# Patient Record
Sex: MALE | Race: WHITE | NOT HISPANIC OR LATINO | Employment: FULL TIME | URBAN - METROPOLITAN AREA
[De-identification: names, ages, dates, MRNs, and addresses within clinical notes are randomized per-mention and may not be internally consistent; named-entity substitution may affect disease eponyms.]

---

## 2022-11-16 ENCOUNTER — HOSPITAL ENCOUNTER (EMERGENCY)
Facility: HOSPITAL | Age: 47
Discharge: HOME | End: 2022-11-16
Attending: EMERGENCY MEDICINE

## 2022-11-16 VITALS
HEIGHT: 70 IN | RESPIRATION RATE: 18 BRPM | BODY MASS INDEX: 30.78 KG/M2 | SYSTOLIC BLOOD PRESSURE: 121 MMHG | HEART RATE: 76 BPM | WEIGHT: 215 LBS | DIASTOLIC BLOOD PRESSURE: 68 MMHG | OXYGEN SATURATION: 96 % | TEMPERATURE: 97.6 F

## 2022-11-16 DIAGNOSIS — M79.89 LEG SWELLING: Primary | ICD-10-CM

## 2022-11-16 RX ORDER — ENOXAPARIN SODIUM 100 MG/ML
30 INJECTION SUBCUTANEOUS ONCE
Status: COMPLETED | OUTPATIENT
Start: 2022-11-16 | End: 2022-11-16

## 2022-11-16 RX ADMIN — ENOXAPARIN SODIUM 30 MG: 30 INJECTION SUBCUTANEOUS at 22:42

## 2022-11-17 ENCOUNTER — HOSPITAL ENCOUNTER (OUTPATIENT)
Dept: NON INVASIVE DIAGNOSTICS | Facility: CLINIC | Age: 47
Discharge: HOME | End: 2022-11-17

## 2022-11-17 DIAGNOSIS — M79.89 LEG SWELLING: ICD-10-CM

## 2022-11-17 NOTE — ED ATTENDING ATTESTATION
11/16/2022  I, Caterina Inman MD, saw and evaluated the patient  I have discussed the patient with the resident/non-physician practitioner and agree with the resident's/non-physician practitioner's findings, Plan of Care, and MDM as documented in the resident's/non-physician practitioner's note, except where noted  All available labs and Radiology studies were reviewed  I was present for key portions of any procedure(s) performed by the resident/non-physician practitioner and I was immediately available to provide assistance  At this point I agree with the current assessment done in the Emergency Department  I have conducted an independent evaluation of this patient a history and physical is as follows:    S:  Chief Complaint   Patient presents with   • Leg Swelling     Pt c/o of right calf swelling and tightness that started this morning  Denies pain  Denies CP, SOB     Laya Lima is a 49 y o  male who presents with the chief complaint of right calf pain and tenderness  He reports he woke up this morning with this discomfort  His wife who is a nurse told him that it could be a blood clot but he brushed it off  He reports however that when a riley at the [employer] he frequents told him it could be a blood clot that he decided to come get it checked out  He denies fevers, redness in the leg, chest pain or shortness of breath  No history of trauma to the leg  No recent surgery, immobilization  No known cancers  O:  ED Triage Vitals [11/16/22 2122]   Temperature Pulse Respirations Blood Pressure SpO2   97 6 °F (36 4 °C) 72 16 160/72 96 %      Temp Source Heart Rate Source Patient Position - Orthostatic VS BP Location FiO2 (%)   Oral Monitor Sitting Right arm --      Pain Score       No Pain         Physical Exam  Vitals and nursing note reviewed  Constitutional:       General: He is in acute distress (mild)  Appearance: He is well-developed     HENT:      Head: Normocephalic and atraumatic  Eyes:      Extraocular Movements: Extraocular movements intact  Pupils: Pupils are equal, round, and reactive to light  Neck:      Vascular: No JVD  Cardiovascular:      Rate and Rhythm: Normal rate and regular rhythm  Heart sounds: Normal heart sounds  No murmur heard  No friction rub  No gallop  Pulmonary:      Effort: Pulmonary effort is normal  No respiratory distress  Breath sounds: Normal breath sounds  No wheezing or rales  Chest:      Chest wall: No tenderness  Musculoskeletal:         General: Tenderness (right calf) present  No signs of injury  Normal range of motion  Cervical back: Normal range of motion  Right lower leg: Edema (mild) present  Skin:     General: Skin is warm and dry  Neurological:      General: No focal deficit present  Mental Status: He is alert and oriented to person, place, and time  Psychiatric:         Behavior: Behavior normal          Thought Content: Thought content normal          Judgment: Judgment normal        A/P:  Patient presents with swelling and pain in his right calf  It is possible that this is a DVT however the patient presented after our vascular ultrasound hours  Patient will be given a dose of enoxaparin and will have an outpatient ultrasound ordered for him to be scheduled in the morning  Per our protocol  This is explained to the patient and he is comfortable with this plan  Other differential considerations include muscular strain, contusion, Baker cyst   No signs or symptoms consistent with infection  Patient has normal pulses so doubt arterial component  Patient has normal sensation  Patient has normal strength as well      ED Course     Medications   enoxaparin (LOVENOX) subcutaneous injection 30 mg (30 mg Subcutaneous Given 11/16/22 2249)         Critical Care Time  Procedures    Time reflects when diagnosis was documented in both MDM as applicable and the Disposition within this note Time User Action Codes Description Comment    11/16/2022 10:19 PM Katelin Plummer Add [A97 03] Leg swelling       ED Disposition     ED Disposition   Discharge    Condition   Stable    Date/Time   Wed Nov 16, 2022 10:19 PM    Comment   Cr Mayer discharge to home/self care                 Follow-up Information     Follow up With Specialties Details Why Contact Info Additional Information    [facility] Emergency Medicine  As needed, If symptoms worsen [address] Emergency Department, [address], [place], [place], [address]

## 2022-11-19 NOTE — ED PROVIDER NOTES
History  Chief Complaint   Patient presents with   • Leg Swelling     Pt c/o of right calf swelling and tightness that started this morning  Denies pain  Denies CP, SOB     54-year-old male with no significant past medical history presents for evaluation of right calf pain and swelling that started this morning when he woke up  Patient states he has a history of cramping that occurs in his calf muscles  Patient states this morning the pain felt similar however later in the evening he noticed that his right calf is more swollen compared to his left  Patient denies history of DVT  Patient is not on any anticoagulation  Patient denies chest pain, shortness of breath or any other concerns at this time  None       History reviewed  No pertinent past medical history  Past Surgical History:   Procedure Laterality Date   • GALLBLADDER SURGERY     • HERNIA REPAIR     • STOMACH SURGERY      gastric sleeve       History reviewed  No pertinent family history  I have reviewed and agree with the history as documented  E-Cigarette/Vaping     E-Cigarette/Vaping Substances     Social History     Tobacco Use   • Smoking status: Never   • Smokeless tobacco: Never   Substance Use Topics   • Drug use: Never        Review of Systems   Constitutional: Negative for chills and fever  HENT: Negative for ear pain and sore throat  Eyes: Negative for pain and visual disturbance  Respiratory: Negative for cough and shortness of breath  Cardiovascular: Negative for chest pain and palpitations  Gastrointestinal: Negative for abdominal pain and vomiting  Genitourinary: Negative for dysuria and hematuria  Musculoskeletal: Negative for arthralgias and back pain  Right calf pain and swelling    Skin: Negative for color change and rash  Neurological: Negative for seizures and syncope  All other systems reviewed and are negative        Physical Exam  ED Triage Vitals [11/16/22 2122]   Temperature Pulse Respirations Blood Pressure SpO2   97 6 °F (36 4 °C) 72 16 160/72 96 %      Temp Source Heart Rate Source Patient Position - Orthostatic VS BP Location FiO2 (%)   Oral Monitor Sitting Right arm --      Pain Score       No Pain             Orthostatic Vital Signs  Vitals:    11/16/22 2122 11/16/22 2153 11/16/22 2200   BP: 160/72 121/68 121/68   Pulse: 72 76    Patient Position - Orthostatic VS: Sitting  Sitting       Physical Exam  Vitals and nursing note reviewed  Constitutional:       General: He is not in acute distress  Appearance: Normal appearance  He is well-developed  He is not ill-appearing, toxic-appearing or diaphoretic  HENT:      Head: Normocephalic and atraumatic  Mouth/Throat:      Mouth: Mucous membranes are moist    Eyes:      Conjunctiva/sclera: Conjunctivae normal    Cardiovascular:      Rate and Rhythm: Normal rate and regular rhythm  Heart sounds: No murmur heard  Pulmonary:      Effort: Pulmonary effort is normal  No respiratory distress  Breath sounds: Normal breath sounds  Abdominal:      General: Abdomen is flat  Palpations: Abdomen is soft  Tenderness: There is no abdominal tenderness  Musculoskeletal:         General: No swelling  Cervical back: Neck supple  Comments: Mild tenderness overlying right calf with mild swelling compared to the left calf; no overlying skin changes; neurovascularly intact    Skin:     General: Skin is warm and dry  Capillary Refill: Capillary refill takes less than 2 seconds  Neurological:      Mental Status: He is alert     Psychiatric:         Mood and Affect: Mood normal          ED Medications  Medications   enoxaparin (LOVENOX) subcutaneous injection 30 mg (30 mg Subcutaneous Given 11/16/22 2242)       Diagnostic Studies  Results Reviewed     None                 No orders to display         Procedures  Procedures      ED Course                             SBIRT 20yo+    Flowsheet Row Most Recent Value SBIRT (23 yo +)    In order to provide better care to our patients, we are screening all of our patients for alcohol and drug use  Would it be okay to ask you these screening questions? Yes Filed at: 11/16/2022 2150   Initial Alcohol Screen: US AUDIT-C     1  How often do you have a drink containing alcohol? 0 Filed at: 11/16/2022 2150   2  How many drinks containing alcohol do you have on a typical day you are drinking? 0 Filed at: 11/16/2022 2150   3a  Male UNDER 65: How often do you have five or more drinks on one occasion? 0 Filed at: 11/16/2022 2150   3b  FEMALE Any Age, or MALE 65+: How often do you have 4 or more drinks on one occassion? 0 Filed at: 11/16/2022 2150   Audit-C Score 0 Filed at: 11/16/2022 2150   MARIAH: How many times in the past year have you    Used an illegal drug or used a prescription medication for non-medical reasons? Never Filed at: 11/16/2022 2150                MDM  Number of Diagnoses or Management Options  Leg swelling  Diagnosis management comments: 43-year-old male presents for right-sided calf pain and swelling  Unfortunately, ultrasound was not available overnight for venous duplex study  Given findings suspicious for DVT, patient was prescribed 1 time dose of Lovenox and advised to schedule DVT ultrasound in the morning  Recommended follow-up with PCP  Reviewed reasons to return to the emergency department        Risk of Complications, Morbidity, and/or Mortality  Presenting problems: moderate  Diagnostic procedures: low  Management options: low    Patient Progress  Patient progress: stable      Disposition  Final diagnoses:   Leg swelling     Time reflects when diagnosis was documented in both MDM as applicable and the Disposition within this note     Time User Action Codes Description Comment    11/16/2022 10:19 PM Gold Solano Add [M79 [id number]] Leg swelling       ED Disposition     ED Disposition   Discharge    Condition   Stable    Date/Time   Wed Nov 16, 2022 10:19 PM Comment   Cr Mayer discharge to home/self care  Follow-up Information     Follow up With Specialties Details Why Contact Info Additional Information    [facility] Emergency Medicine  As needed, If symptoms worsen [address] Emergency Department, [address], [place], [place], [address]          There are no discharge medications for this patient  Outpatient Discharge Orders   VAS lower limb venous duplex study, unilateral/limited   Standing Status: Future Number of Occurrences: 1 Standing Exp  Date: 11/16/26       PDMP Review     None           ED Provider  Attending physically available and evaluated Jose Manuel Metzger I managed the patient along with the ED Attending      Electronically Signed by         Edmond Gan MD  11/18/22 9312